# Patient Record
Sex: FEMALE | URBAN - METROPOLITAN AREA
[De-identification: names, ages, dates, MRNs, and addresses within clinical notes are randomized per-mention and may not be internally consistent; named-entity substitution may affect disease eponyms.]

---

## 2021-04-13 ENCOUNTER — APPOINTMENT (RX ONLY)
Dept: URBAN - METROPOLITAN AREA CLINIC 106 | Facility: CLINIC | Age: 51
Setting detail: DERMATOLOGY
End: 2021-04-13

## 2021-04-13 DIAGNOSIS — L57.8 OTHER SKIN CHANGES DUE TO CHRONIC EXPOSURE TO NONIONIZING RADIATION: ICD-10-CM

## 2021-04-13 DIAGNOSIS — Z41.9 ENCOUNTER FOR PROCEDURE FOR PURPOSES OTHER THAN REMEDYING HEALTH STATE, UNSPECIFIED: ICD-10-CM

## 2021-04-13 DIAGNOSIS — L82.0 INFLAMED SEBORRHEIC KERATOSIS: ICD-10-CM

## 2021-04-13 PROCEDURE — 99202 OFFICE O/P NEW SF 15 MIN: CPT

## 2021-04-13 PROCEDURE — ? COUNSELING

## 2021-04-13 PROCEDURE — ? ADDITIONAL NOTES

## 2021-04-13 NOTE — PROCEDURE: ADDITIONAL NOTES
Additional Notes: A total of 2 lesions were treated with liquid nitrogen for 2 freeze-thaw cycles, located on the left lateral mandibular cheek and nasal supratip. \\nThis procedure was medically necessary because the lesions that were treated were: itchy. The patient's verbal consent was obtained including but not limited to risks of crusting, scabbing, blistering, scarring, darker or lighter pigmentary change, recurrence, incomplete removal and infection.
Detail Level: Simple
Render Risk Assessment In Note?: no
Additional Notes: Consider laser treatment (Morpheus/Acutite) vs facelift.\\Messi. Niraj Lynch information given to patient.